# Patient Record
Sex: MALE | Race: WHITE | Employment: FULL TIME | URBAN - METROPOLITAN AREA
[De-identification: names, ages, dates, MRNs, and addresses within clinical notes are randomized per-mention and may not be internally consistent; named-entity substitution may affect disease eponyms.]

---

## 2024-08-21 ENCOUNTER — APPOINTMENT (OUTPATIENT)
Dept: CT IMAGING | Facility: HOSPITAL | Age: 34
End: 2024-08-21
Attending: NURSE PRACTITIONER
Payer: COMMERCIAL

## 2024-08-21 ENCOUNTER — HOSPITAL ENCOUNTER (EMERGENCY)
Facility: HOSPITAL | Age: 34
Discharge: HOME | End: 2024-08-21
Payer: COMMERCIAL

## 2024-08-21 ENCOUNTER — HOSPITAL ENCOUNTER (OUTPATIENT)
Age: 34
Discharge: ANOTHER HOSPITAL | End: 2024-08-21
Payer: COMMERCIAL

## 2024-08-21 VITALS
RESPIRATION RATE: 18 BRPM | WEIGHT: 195 LBS | BODY MASS INDEX: 26.41 KG/M2 | SYSTOLIC BLOOD PRESSURE: 126 MMHG | DIASTOLIC BLOOD PRESSURE: 75 MMHG | HEIGHT: 72 IN | OXYGEN SATURATION: 99 % | TEMPERATURE: 97 F | HEART RATE: 70 BPM

## 2024-08-21 VITALS
SYSTOLIC BLOOD PRESSURE: 124 MMHG | DIASTOLIC BLOOD PRESSURE: 67 MMHG | TEMPERATURE: 97 F | HEART RATE: 64 BPM | RESPIRATION RATE: 16 BRPM | OXYGEN SATURATION: 99 %

## 2024-08-21 DIAGNOSIS — R10.9 ABDOMINAL PAIN OF UNKNOWN ETIOLOGY: Primary | ICD-10-CM

## 2024-08-21 DIAGNOSIS — T50.8X5A ALLERGIC REACTION TO CONTRAST MATERIAL, INITIAL ENCOUNTER: ICD-10-CM

## 2024-08-21 DIAGNOSIS — T50.8X5A ADVERSE EFFECT OF CONTRAST MEDIA, INITIAL ENCOUNTER: Primary | ICD-10-CM

## 2024-08-21 LAB
#MXD IC: 0.4 X10ˆ3/UL (ref 0.1–1)
BILIRUB UR QL STRIP: NEGATIVE
BUN BLD-MCNC: 15 MG/DL (ref 7–18)
CHLORIDE BLD-SCNC: 101 MMOL/L (ref 98–112)
CLARITY UR: CLEAR
CO2 BLD-SCNC: 25 MMOL/L (ref 21–32)
CREAT BLD-MCNC: 1.1 MG/DL
EGFRCR SERPLBLD CKD-EPI 2021: 91 ML/MIN/1.73M2 (ref 60–?)
GLUCOSE BLD-MCNC: 94 MG/DL (ref 70–99)
GLUCOSE UR STRIP-MCNC: NEGATIVE MG/DL
HCT VFR BLD AUTO: 45.7 %
HCT VFR BLD CALC: 49 %
HGB BLD-MCNC: 15.5 G/DL
HGB UR QL STRIP: NEGATIVE
ISTAT IONIZED CALCIUM FOR CHEM 8: 1.26 MMOL/L (ref 1.12–1.32)
LEUKOCYTE ESTERASE UR QL STRIP: NEGATIVE
LYMPHOCYTES # BLD AUTO: 2 X10ˆ3/UL (ref 1–4)
LYMPHOCYTES NFR BLD AUTO: 28.3 %
MCH RBC QN AUTO: 28.9 PG (ref 26–34)
MCHC RBC AUTO-ENTMCNC: 33.9 G/DL (ref 31–37)
MCV RBC AUTO: 85.3 FL (ref 80–100)
MIXED CELL %: 6.4 %
NEUTROPHILS # BLD AUTO: 4.6 X10ˆ3/UL (ref 1.5–7.7)
NEUTROPHILS NFR BLD AUTO: 65.3 %
NITRITE UR QL STRIP: NEGATIVE
PH UR STRIP: 6 [PH]
PLATELET # BLD AUTO: 345 X10ˆ3/UL (ref 150–450)
POTASSIUM BLD-SCNC: 3.9 MMOL/L (ref 3.6–5.1)
PROT UR STRIP-MCNC: NEGATIVE MG/DL
RBC # BLD AUTO: 5.36 X10ˆ6/UL
SODIUM BLD-SCNC: 140 MMOL/L (ref 136–145)
SP GR UR STRIP: 1.02
UROBILINOGEN UR STRIP-ACNC: <2 MG/DL
WBC # BLD AUTO: 7 X10ˆ3/UL (ref 4–11)

## 2024-08-21 PROCEDURE — 96361 HYDRATE IV INFUSION ADD-ON: CPT

## 2024-08-21 PROCEDURE — 81002 URINALYSIS NONAUTO W/O SCOPE: CPT | Performed by: NURSE PRACTITIONER

## 2024-08-21 PROCEDURE — 96375 TX/PRO/DX INJ NEW DRUG ADDON: CPT

## 2024-08-21 PROCEDURE — 99284 EMERGENCY DEPT VISIT MOD MDM: CPT

## 2024-08-21 PROCEDURE — 96374 THER/PROPH/DIAG INJ IV PUSH: CPT

## 2024-08-21 PROCEDURE — 85025 COMPLETE CBC W/AUTO DIFF WBC: CPT | Performed by: NURSE PRACTITIONER

## 2024-08-21 PROCEDURE — 99205 OFFICE O/P NEW HI 60 MIN: CPT | Performed by: NURSE PRACTITIONER

## 2024-08-21 PROCEDURE — 74177 CT ABD & PELVIS W/CONTRAST: CPT | Performed by: NURSE PRACTITIONER

## 2024-08-21 PROCEDURE — 80047 BASIC METABLC PNL IONIZED CA: CPT | Performed by: NURSE PRACTITIONER

## 2024-08-21 RX ORDER — DIPHENHYDRAMINE HYDROCHLORIDE 50 MG/ML
25 INJECTION INTRAMUSCULAR; INTRAVENOUS ONCE
Status: COMPLETED | OUTPATIENT
Start: 2024-08-21 | End: 2024-08-21

## 2024-08-21 RX ORDER — METHYLPREDNISOLONE SODIUM SUCCINATE 125 MG/2ML
125 INJECTION, POWDER, LYOPHILIZED, FOR SOLUTION INTRAMUSCULAR; INTRAVENOUS ONCE
Status: COMPLETED | OUTPATIENT
Start: 2024-08-21 | End: 2024-08-21

## 2024-08-21 RX ORDER — HYDROXYZINE HYDROCHLORIDE 25 MG/1
TABLET, FILM COATED ORAL EVERY 4 HOURS PRN
Qty: 20 TABLET | Refills: 0 | Status: SHIPPED | OUTPATIENT
Start: 2024-08-21 | End: 2024-09-20

## 2024-08-21 RX ORDER — PREDNISONE 20 MG/1
40 TABLET ORAL DAILY
Qty: 6 TABLET | Refills: 0 | Status: SHIPPED | OUTPATIENT
Start: 2024-08-21 | End: 2024-08-24

## 2024-08-21 NOTE — ED INITIAL ASSESSMENT (HPI)
Patient comes in states that he has been feeling and sharp and dull ache on the L side of his abdomen for 5-6 days. Nauseous once during that time otherwise no other symptoms. Does some weightligting

## 2024-08-21 NOTE — ED PROVIDER NOTES
Patient Seen in: [facility] Emergency Department      History     Chief Complaint   Patient presents with    Allergic Rxn Allergies     Stated Complaint: Allergic Rxn    Subjective:   32yo/m w hx of HLD reports to the ED w a suspected reaction to IV contrast. Patient was at the immediate care being evaluated for abdominal pain. Had a CT w con and developed hives/itching after returning to exam room. No trouble breathing/speaking/swallowing. No swelling of tongue or lips. No chest pain. No wheezing/dyspnea.             Objective:   Past Medical History:    Hyperlipidemia              Past Surgical History:   Procedure Laterality Date    Appendectomy                  Social History     Socioeconomic History    Marital status:    Tobacco Use    Smoking status: Never     Passive exposure: Never    Smokeless tobacco: Never   Vaping Use    Vaping status: Never Used   Substance and Sexual Activity    Alcohol use: Never     Comment: 6-7 a week    Drug use: Never     Social Determinants of Health      Received from [facility]              Review of Systems   All other systems reviewed and are negative.      Positive for stated Chief Complaint: Allergic Rxn Allergies    Other systems are as noted in HPI.  Constitutional and vital signs reviewed.      All other systems reviewed and negative except as noted above.    Physical Exam     ED Triage Vitals [08/21/24 1217]   /75   Pulse 70   Resp 18   Temp 97.2 °F (36.2 °C)   Temp src Temporal   SpO2 99 %   O2 Device None (Room air)       Current Vitals:   Vital Signs  BP: 126/75  Pulse: 70  Resp: 18  Temp: 97.2 °F (36.2 °C)  Temp src: Temporal    Oxygen Therapy  SpO2: 99 %  O2 Device: None (Room air)            Physical Exam  Vitals and nursing note reviewed.   Constitutional:       General: He is not in acute distress.     Appearance: He is well-developed.   HENT:      Head: Normocephalic and atraumatic.      Nose: Nose normal.       Mouth/Throat:      Mouth: Mucous membranes are moist.   Eyes:      Conjunctiva/sclera: Conjunctivae normal.      Pupils: Pupils are equal, round, and reactive to light.   Cardiovascular:      Rate and Rhythm: Normal rate and regular rhythm.      Heart sounds: Normal heart sounds.   Pulmonary:      Effort: Pulmonary effort is normal.      Breath sounds: Normal breath sounds.   Abdominal:      General: Bowel sounds are normal.      Palpations: Abdomen is soft.   Musculoskeletal:         General: No tenderness or deformity. Normal range of motion.      Cervical back: Normal range of motion and neck supple.   Skin:     General: Skin is warm and dry.      Capillary Refill: Capillary refill takes less than 2 seconds.      Findings: No rash.      Comments: Normal color   Neurological:      General: No focal deficit present.      Mental Status: He is alert and oriented to person, place, and time.      GCS: GCS eye subscore is 4. GCS verbal subscore is 5. GCS motor subscore is 6.      Cranial Nerves: No cranial nerve deficit.      Gait: Gait normal.               ED Course   Labs Reviewed - No data to display                   MDM                  Medical Decision Making  32yo/m w hx and exam as stated; rash    Non toxic, well appearing  No vomiting  No chest pain  No trouble breathing  Overall stable  Rash resolved    Plan  Dc to home  Close fu      Risk  OTC drugs.  Prescription drug management.        Disposition and Plan     Clinical Impression:  1. Adverse effect of contrast media, initial encounter         Disposition:  Discharge  8/21/2024  1:31 pm    Follow-up:  Juliana Byrd [facility] [address]  [facility] [id number] [phone]    Follow up in 2 day(s)            Medications Prescribed:  Current Discharge Medication List        START taking these medications    Details   predniSONE 20 MG Oral Tab Take 2 tablets (40 mg total) by mouth daily for 3 days.  Qty: 6 tablet, Refills: 0      hydrOXYzine 25 MG Oral  Tab Take 1-2 tablets (25-50 mg total) by mouth every 4 (four) hours as needed for Itching.  Qty: 20 tablet, Refills: 0

## 2024-08-21 NOTE — ED PROVIDER NOTES
Patient Seen in: [facility]      History     Chief Complaint   Patient presents with    Abdominal Pain     Stated Complaint: adominal problem    Subjective: This is a 33-year-old male with a past medical history of hyperlipidemia and appendectomy, presents to immediate care clinic for evaluation of left lower quadrant abdominal pain that started 5 to 6 days ago.  Patient states he initially had pain to left lower quadrant to groin and has since had left upper and lower abdominal pain.  Nonradiating.  No aggravating or alleviating factors.  Intermittent nausea without vomiting.  Patient states he always has \"stomach issues\" -mostly diarrhea.  Patient is having normal bowel movements without blood, pus, mucus.  No recent changes to his diet or exercise routine.  Patient states he probably has not drink enough water with abdominal pain.  No urinary issues of: Pain, burning, frequency, retention, hematuria.  No fever, chills, fatigue.  No recent travel.  Well-appearing.  AOx4.  The history is provided by the patient.           Objective:   Past Medical History:    Hyperlipidemia              Past Surgical History:   Procedure Laterality Date    Appendectomy                  Social History     Socioeconomic History    Marital status:    Tobacco Use    Smoking status: Never     Passive exposure: Never    Smokeless tobacco: Never   Vaping Use    Vaping status: Never Used   Substance and Sexual Activity    Alcohol use: Never     Comment: 6-7 a week    Drug use: Never     Social Determinants of Health      Received from [facility]              Review of Systems   Constitutional: Negative.  Negative for activity change, appetite change, chills, fatigue and fever.   Gastrointestinal:  Positive for abdominal distention, abdominal pain, diarrhea and nausea. Negative for anal bleeding, blood in stool, constipation and vomiting.   Genitourinary: Negative.  Negative for difficulty urinating,  dysuria, flank pain, frequency and hematuria.   Musculoskeletal: Negative.  Negative for back pain.       Positive for stated Chief Complaint: Abdominal Pain    Other systems are as noted in HPI.  Constitutional and vital signs reviewed.      All other systems reviewed and negative except as noted above.    Physical Exam     ED Triage Vitals [08/21/24 0934]   /68   Pulse 58   Resp 16   Temp 97.1 °F (36.2 °C)   Temp src Temporal   SpO2 100 %   O2 Device None (Room air)       Current Vitals:   Vital Signs  BP: 124/68  Pulse: 58  Resp: 16  Temp: 97.1 °F (36.2 °C)  Temp src: Temporal    Oxygen Therapy  SpO2: 100 %  O2 Device: None (Room air)            Physical Exam  Constitutional:       General: He is not in acute distress.     Appearance: He is well-developed. He is not ill-appearing or toxic-appearing.   HENT:      Head: Normocephalic.      Mouth/Throat:      Mouth: Mucous membranes are moist.   Eyes:      Extraocular Movements: Extraocular movements intact.      Pupils: Pupils are equal, round, and reactive to light.   Cardiovascular:      Rate and Rhythm: Normal rate and regular rhythm.      Heart sounds: Normal heart sounds.   Pulmonary:      Effort: Pulmonary effort is normal. No respiratory distress.      Breath sounds: Normal breath sounds. No stridor. No wheezing, rhonchi or rales.   Chest:      Chest wall: No tenderness.   Abdominal:      General: Abdomen is flat. Bowel sounds are normal.      Palpations: Abdomen is soft.      Tenderness: There is abdominal tenderness in the left upper quadrant and left lower quadrant. There is no right CVA tenderness, left CVA tenderness, guarding or rebound. Negative signs include Suarez's sign.      Hernia: No hernia is present.   Skin:     General: Skin is warm.      Capillary Refill: Capillary refill takes less than 2 seconds.   Neurological:      General: No focal deficit present.      Mental Status: He is alert and oriented to person, place, and time.                ED Course     Labs Reviewed   [facility] POCT URINALYSIS DIPSTICK - Abnormal; Notable for the following components:       Result Value    Ketone, Urine Trace (*)     All other components within normal limits   POCT ISTAT CHEM8 CARTRIDGE - Normal   POCT CBC       CT ABDOMEN+PELVIS(CONTRAST ONLY)(CPT=74177)    Result Date: 8/21/2024  CONCLUSION:   No explanation for left-sided pain  Indeterminate liver lesion which could be better characterized with MRI    Dictated by (CST): Roe Godfrey MD on 8/21/2024 at 11:35 AM     Finalized by (CST): Roe Godfrey MD on 8/21/2024 at 11:38 AM                          MDM      Differentials considered include: Diverticulosis versus diverticulitis, nephrolithiasis, colitis/enteritis, IBS, constipation.    Abdomen soft on exam.  Minimal tenderness to LLQ/LUQ/ Left middle quadrant Bowel sounds present.  No rebound or guarding.  No palpable mass or organomegaly.  No tympany to percussion.      CBC within normal limits.  No leukocytosis.  No anemia.  Chemistry within normal limits.  No LUANNE.  Urine without evidence of nitrates, leukocytes, blood.  Trace amount of ketones.    CT scan obtained: No acute intra-abdominal normality seen.  Incidental finding of nonspecific liver nodule.  Did discuss findings with patient via telephone.  While on telephone with patient, he informed provider that he was having urticaria to lower extremities and abdomen.  Did notify CT immediately to put eyes on the patient, spoke to Daniela with CT.     Also spoke to Joan DRAKE RN who states that urticaria has progressed to chest.  Patient began experiencing chills.  Airway remains patent well provider speaking to patient as well as on observation from Trinidad BRAMBILA.      Did advise ER for higher level care and allergic reaction medication.  Discussed with patient who is agreeable and verbalized understanding.  Trinidad BRAMBILA verbalized understanding, will transfer patient to ER and also notify ER of patient's incoming arrival.   Patient's allergy to contrast dye was added to his chart.    Patient taken to ER with IV in place.    Spoke to supervising physician, Dr. Alanis who agrees with plan of care.                                 Medical Decision Making  Amount and/or Complexity of Data Reviewed  Labs: ordered.  Radiology: ordered.        Disposition and Plan     Clinical Impression:  1. Abdominal pain of unknown etiology    2. Allergic reaction to contrast material, initial encounter         Disposition:  Ic to ed  8/21/2024 12:07 pm    Follow-up:  No follow-up provider specified.        Medications Prescribed:  There are no discharge medications for this patient.

## 2024-08-21 NOTE — IMAGING NOTE
Called by CT tech to see pt in CT Holding bay for reaction to IV contrast. Pt alert & oriented, said feels itchy & has hives, no previous reaction to IV contrast dye. Pt sitting upright, respirations normal, skin dry, pt denies respiratory symptoms. Pt has small hives surrounded by non-raised redness on abdomen, lower chest, anterior thighs. Pt also has insect bites on his chest/abdm but is able to differentiate these areas. CT tech had given pt 2 bottles water. Pt is here by himself, lives in [place]. Walked pt to Radiology Holding for observation. IV in place from [facility]. See VS flowsheet. Gave pt juice & crackers. Pt asked for blanket, mild shivering. No other symptoms. Offered pt Benadryl, would have to stay longer for observation due to potential side effect of sleepiness. Pt declines med, said he thinks has some at home, used to take it daytime for seasonal allergies. I called [facility] RN,  at 1203, informed her of reaction, pt's symptoms, she recommends pt go to ED, she talked to pt, he is in agreement. I added allergy to EMR, informed pt in future will need pre-med of prednisone 3 timed doses & Benadryl 1 dose prior to any radiology IV contrast test. Pt up to BR, tolerated well. I notified ED TL, Pt walked to ED w/escort by CRISTELA Lucas RN at 1211.

## 2024-08-21 NOTE — ED INITIAL ASSESSMENT (HPI)
Pt c/o of allergic reaction to Ct contrast. Pt states he was getting a CT with contrast around 1130am and he started feeling itchy. Pt denies any itchiness to his throat right now. Denies KWESI. Denies any swelling. No hives noted.Pt just c/o of feeling weak and cold.

## 2024-09-17 ENCOUNTER — HOSPITAL ENCOUNTER (OUTPATIENT)
Age: 34
Discharge: ANOTHER HOSPITAL | End: 2024-09-17
Payer: COMMERCIAL

## 2024-09-17 VITALS
HEART RATE: 65 BPM | RESPIRATION RATE: 18 BRPM | TEMPERATURE: 98 F | OXYGEN SATURATION: 100 % | SYSTOLIC BLOOD PRESSURE: 119 MMHG | DIASTOLIC BLOOD PRESSURE: 76 MMHG

## 2024-09-17 DIAGNOSIS — R10.9 ABDOMINAL PAIN, ACUTE: Primary | ICD-10-CM

## 2024-09-17 PROCEDURE — 99215 OFFICE O/P EST HI 40 MIN: CPT | Performed by: NURSE PRACTITIONER

## 2024-09-18 NOTE — ED INITIAL ASSESSMENT (HPI)
Chronic abd pain since march.  Upper right abd pain off and on today but pain getting worse.  Denies temp.

## 2024-09-18 NOTE — ED PROVIDER NOTES
Patient Seen in: [facility]      History     Chief Complaint   Patient presents with    Abdomen/Flank Pain     Stated Complaint: Abdominal Pain    Subjective:   HPI    33-year-old male presents with complaints of abdominal pain.  Patient states he has had intermittent abdominal pain x 1 month.  He states today it feels worse and constant.  He describes the pain mid epigastric to right upper quadrant.  There is no fever.  There is no vomiting.  The patient denies diarrhea.    Objective:   Past Medical History:    Hyperlipidemia              Past Surgical History:   Procedure Laterality Date    Appendectomy                  Social History     Socioeconomic History    Marital status:    Tobacco Use    Smoking status: Never     Passive exposure: Never    Smokeless tobacco: Never   Vaping Use    Vaping status: Never Used   Substance and Sexual Activity    Alcohol use: Never     Comment: 6-7 a week    Drug use: Never     Social Determinants of Health      Received from [facility]              Review of Systems    Positive for stated Chief Complaint: Abdomen/Flank Pain    Other systems are as noted in HPI.  Constitutional and vital signs reviewed.      All other systems reviewed and negative except as noted above.    Physical Exam     ED Triage Vitals [09/17/24 1915]   /76   Pulse 65   Resp 18   Temp 97.5 °F (36.4 °C)   Temp src Temporal   SpO2 100 %   O2 Device None (Room air)       Current Vitals:   No data recorded          Physical Exam  Vitals reviewed.   Constitutional:       General: He is not in acute distress.     Appearance: He is not ill-appearing.   Cardiovascular:      Rate and Rhythm: Normal rate and regular rhythm.   Pulmonary:      Effort: Pulmonary effort is normal.      Breath sounds: Normal breath sounds.   Abdominal:      General: Bowel sounds are normal.      Palpations: Abdomen is soft.      Tenderness: There is abdominal tenderness in the right upper  quadrant and epigastric area.   Neurological:      General: No focal deficit present.      Mental Status: He is alert.   Psychiatric:         Mood and Affect: Mood normal.         Behavior: Behavior normal.               ED Course   Labs Reviewed - No data to display                   MDM                                         Medical Decision Making  33-year-old male presents with abdominal pain.  Differential diagnosis includes peptic ulcer disease, reflux disease, gastroenteritis, cholecystitis.  On exam there is tenderness to palpate mid epigastric to right upper quadrant.  This is concern for cholecystitis.  Patient was instructed he will need to go to the emergency department for further evaluation and treatment.  Patient is in agreement.  And will travel to [facility] emergency department by car.        Disposition and Plan     Clinical Impression:  1. Abdominal pain, acute         Disposition:  Ic to ed  9/17/2024  7:25 pm    Follow-up:  Juliana Byrd [address] [facility] [address]  [facility] [id number] [phone]      If symptoms worsen          Medications Prescribed:  Discharge Medication List as of 9/17/2024  7:28 PM

## 2024-10-22 ENCOUNTER — HOSPITAL ENCOUNTER (OUTPATIENT)
Age: 34
Discharge: HOME | End: 2024-10-22
Payer: COMMERCIAL

## 2024-10-22 VITALS
HEART RATE: 70 BPM | RESPIRATION RATE: 16 BRPM | SYSTOLIC BLOOD PRESSURE: 131 MMHG | TEMPERATURE: 97 F | OXYGEN SATURATION: 99 % | DIASTOLIC BLOOD PRESSURE: 82 MMHG

## 2024-10-22 DIAGNOSIS — R35.0 URINARY FREQUENCY: ICD-10-CM

## 2024-10-22 DIAGNOSIS — R10.84 GENERALIZED ABDOMINAL PAIN: Primary | ICD-10-CM

## 2024-10-22 LAB
BILIRUB UR QL STRIP: NEGATIVE
CLARITY UR: CLEAR
COLOR UR: YELLOW
GLUCOSE BLDC GLUCOMTR-MCNC: 95 MG/DL (ref 70–99)
GLUCOSE UR STRIP-MCNC: NEGATIVE MG/DL
HGB UR QL STRIP: NEGATIVE
KETONES UR STRIP-MCNC: NEGATIVE MG/DL
LEUKOCYTE ESTERASE UR QL STRIP: NEGATIVE
NITRITE UR QL STRIP: NEGATIVE
PH UR STRIP: 6.5 [PH]
PROT UR STRIP-MCNC: NEGATIVE MG/DL
SP GR UR STRIP: 1.01
UROBILINOGEN UR STRIP-ACNC: <2 MG/DL

## 2024-10-22 PROCEDURE — 82962 GLUCOSE BLOOD TEST: CPT | Performed by: NURSE PRACTITIONER

## 2024-10-22 PROCEDURE — 81002 URINALYSIS NONAUTO W/O SCOPE: CPT | Performed by: NURSE PRACTITIONER

## 2024-10-22 PROCEDURE — 99213 OFFICE O/P EST LOW 20 MIN: CPT | Performed by: NURSE PRACTITIONER

## 2024-10-23 NOTE — DISCHARGE INSTRUCTIONS
Avoid spicy greasy fried foods avoid alcohol and caffeinated beverages drink plenty of fluids follow-up bland diet bananas rice applesauce toast crackers and advance as tolerated.  Follow-up with your primary care provider in a few days follow-up with a gastroenterologist for your outpatient EGD and colonoscopy.  If you develop any new or worsening symptoms feel like the pain is getting worse develop nausea and vomiting and cannot keep anything down severe abdominal pain chest pain or shortness of breath or fever go to the nearest emergency department

## 2024-10-23 NOTE — ED INITIAL ASSESSMENT (HPI)
Months of abd pain but since last Friday abd cramping , nausea, dizzy like will pass out. Pains are still present, is no longer dizzy but intermittently nauseas. Subjective fevers at home on Friday. Recently ate [employer] last Tuesday and worried about recent e.coli outbreak.   Reporting urinary frequency and dry mouth .

## 2024-10-23 NOTE — ED PROVIDER NOTES
Patient Seen in: [facility]      History     Chief Complaint   Patient presents with    Abdominal Pain     Stated Complaint: Abdominal Pain    Subjective:   HPI    This is a 33-year-old male with history of hyperlipidemia and appendectomy presenting with abdominal pain.  Patient states he has had abdominal pain for months but since last Friday has been having abdominal cramping some nausea felt dizzy like he was going to pass out is having urinary frequency and feels like his mouth is dry.  Patient states symptoms essentially started after eating [employer] and had turkey chili last week.  Patient states he does have an appointment to get an EGD and a colonoscopy he had labs done about a month ago which were all normal.  Denies any chest pain shortness of breath fever cough or cold symptoms but felt like he had a fever on Friday.  + Occasional EtOH use.      Objective:     Past Medical History:    Hyperlipidemia              Past Surgical History:   Procedure Laterality Date    Appendectomy                  Social History     Socioeconomic History    Marital status:    Tobacco Use    Smoking status: Never     Passive exposure: Never    Smokeless tobacco: Never   Vaping Use    Vaping status: Never Used   Substance and Sexual Activity    Alcohol use: Never     Comment: 6-7 beers a week    Drug use: Never     Social Drivers of Health      Received from [facility]              Review of Systems    Positive for stated complaint: Abdominal Pain  Other systems are as noted in HPI.  Constitutional and vital signs reviewed.      All other systems reviewed and negative except as noted above.    Physical Exam     ED Triage Vitals [10/22/24 1920]   /82   Pulse 70   Resp 16   Temp 97.2 °F (36.2 °C)   Temp src Temporal   SpO2 99 %   O2 Device None (Room air)       Current Vitals:   Vital Signs  BP: 131/82  Pulse: 70  Resp: 16  Temp: 97.2 °F (36.2 °C)  Temp src: Temporal    Oxygen  Therapy  SpO2: 99 %  O2 Device: None (Room air)        Physical Exam  Vitals and nursing note reviewed.   Constitutional:       Appearance: Normal appearance.   HENT:      Right Ear: External ear normal.      Left Ear: External ear normal.      Nose: Nose normal.      Mouth/Throat:      Mouth: Mucous membranes are moist.      Pharynx: Oropharynx is clear.   Eyes:      Conjunctiva/sclera: Conjunctivae normal.   Cardiovascular:      Rate and Rhythm: Normal rate.   Pulmonary:      Effort: Pulmonary effort is normal.   Abdominal:      Palpations: Abdomen is soft.      Tenderness: There is no abdominal tenderness. There is no right CVA tenderness or left CVA tenderness.   Musculoskeletal:         General: Normal range of motion.      Cervical back: Normal range of motion.   Skin:     General: Skin is warm.      Capillary Refill: Capillary refill takes less than 2 seconds.   Neurological:      General: No focal deficit present.      Mental Status: He is alert and oriented to person, place, and time.             ED Course     Labs Reviewed   POCT GLUCOSE - Normal   [facility] POCT URINALYSIS DIPSTICK                 MDM           Medical Decision Making  33-year-old male nontoxic-appearing presenting with months of abdominal pain last Friday started with abdominal cramping nausea felt dizzy having urinary frequency and feels like his mouth is dry.  DDx IBS versus gastroenteritis versus diverticulitis versus overactive bladder syndrome versus a UTI versus dehydration versus a viral illness versus pancreatitis versus cholelithiasis versus cholecystitis.  Patient's chart reviewed 9/17/2024 and 9/19/2024 ER visit and follow-up visit with gastroenterology with no significant findings on the ER visit and has outpatient EGD colonoscopy scheduled.    Patient has no abdominal tenderness or CVA tenderness so no clinical indication for labs or imaging.  After discussing this patient with my attending discussed an Accu-Chek and a urine dip  and following up with a gastroenterologist at his scheduled appointment.  Discussed if he feels that his pain is significant or getting worse gets any nausea or vomiting and cannot keep down oral hydration or any new or worsening symptoms to go to the nearest emergency department otherwise follow-up with primary care provider and gastroenterologist and to avoid fast foods fatty fried foods and spicy foods versus going to the emergency department for evaluation.  Patient states he does not feel that his symptoms are that bad is declining going to the emergency department states that he will try to stick it out and get his EGD and colonoscopy in 2 weeks.  Patient states he understands if something gets worse that he needs to go to the emergency department.    Glucose was normal and urine dip is unremarkable no sign of dehydration no glucose no sign of infection this was discussed with the patient and he acknowledges understanding the results reinforced ER precautions outpatient follow-up dietary habits.  Patient acknowledges understanding discharge instructions.    Problems Addressed:  Generalized abdominal pain: acute illness or injury  Urinary frequency: acute illness or injury    Amount and/or Complexity of Data Reviewed  External Data Reviewed: labs and notes.     Details: 09/17/2024 ER visit [facility] and 9/19/2024 office visit with gastroenterology  Labs: ordered. Decision-making details documented in ED Course.  Discussion of management or test interpretation with external provider(s): This patient was discussed with my attending Dr. Mueller who agrees with this provider's management and plan of care.        Disposition and Plan     Clinical Impression:  1. Generalized abdominal pain    2. Urinary frequency         Disposition:  Discharge  10/22/2024  7:37 pm    Follow-up:  Juliana Byrd  [address] [id number] [phone]    Schedule an appointment as soon as possible for  a visit in 3 days            Medications Prescribed:  Discharge Medication List as of 10/22/2024  7:37 PM              Supplementary Documentation: